# Patient Record
Sex: FEMALE | Race: WHITE
[De-identification: names, ages, dates, MRNs, and addresses within clinical notes are randomized per-mention and may not be internally consistent; named-entity substitution may affect disease eponyms.]

---

## 2017-03-15 ENCOUNTER — HOSPITAL ENCOUNTER (OUTPATIENT)
Dept: HOSPITAL 61 - EKG | Age: 57
Discharge: HOME | End: 2017-03-15
Attending: FAMILY MEDICINE
Payer: COMMERCIAL

## 2017-03-15 DIAGNOSIS — R00.2: Primary | ICD-10-CM

## 2017-03-15 PROCEDURE — 93225 XTRNL ECG REC<48 HRS REC: CPT

## 2017-04-04 ENCOUNTER — HOSPITAL ENCOUNTER (OUTPATIENT)
Dept: HOSPITAL 61 - KCIC | Age: 57
Discharge: HOME | End: 2017-04-04
Attending: PHYSICIAN ASSISTANT
Payer: COMMERCIAL

## 2017-04-04 DIAGNOSIS — M47.892: ICD-10-CM

## 2017-04-04 DIAGNOSIS — M54.2: Primary | ICD-10-CM

## 2017-04-04 PROCEDURE — 72040 X-RAY EXAM NECK SPINE 2-3 VW: CPT

## 2017-04-04 NOTE — [facility]
PROCEDURE 

Cervical spine radiographs 

 

HISTORY 

Neck pain for 2 months 

 

COMPARISON 

None 

 

FINDINGS 

Three views of the cervical spine are submitted. There is adequate 

alignment of the lateral masses of C1 relative to C2. Occipital 

condylar-C1 articulation is maintained. Atlantoaxial distance is within 

normal limits. Cervical vertebral body stature is maintained. There is 

very minimal posterior subluxation C5 relative to C6. There is mild to 

moderate degenerative disc disease greater posteriorly at C5-C6. 

 

IMPRESSION 

1. There is degenerative disc disease C5-C6, very minimal posterior 

subluxation C5 relative to C6.

 

 

 

Electronically signed by: Seun Hoffman MD (Apr 04, 2017 16:05:23)

## 2018-04-15 ENCOUNTER — HOSPITAL ENCOUNTER (EMERGENCY)
Dept: HOSPITAL 61 - ER | Age: 58
Discharge: HOME | End: 2018-04-15
Payer: COMMERCIAL
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
## 2018-04-15 DIAGNOSIS — S61.215A: Primary | ICD-10-CM
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
## 2018-04-15 PROCEDURE — 12002 RPR S/N/AX/GEN/TRNK2.6-7.5CM: CPT

## 2018-04-15 PROCEDURE — 99283 EMERGENCY DEPT VISIT LOW MDM: CPT

## 2018-04-15 PROCEDURE — 90715 TDAP VACCINE 7 YRS/> IM: CPT

## 2018-04-15 PROCEDURE — 90471 IMMUNIZATION ADMIN: CPT

## 2018-04-15 RX ADMIN — LIDOCAINE HYDROCHLORIDE 1 ML: 10 INJECTION, SOLUTION INFILTRATION; PERINEURAL at 15:41

## 2018-04-15 RX ADMIN — TETANUS TOXOID, REDUCED DIPHTHERIA TOXOID AND ACELLULAR PERTUSSIS VACCINE, ADSORBED 1 ML: 5; 2.5; 8; 8; 2.5 SUSPENSION INTRAMUSCULAR at 15:41
